# Patient Record
Sex: FEMALE | Race: WHITE | NOT HISPANIC OR LATINO | Employment: PART TIME | URBAN - METROPOLITAN AREA
[De-identification: names, ages, dates, MRNs, and addresses within clinical notes are randomized per-mention and may not be internally consistent; named-entity substitution may affect disease eponyms.]

---

## 2023-03-09 PROBLEM — L68.0 HIRSUTISM: Status: ACTIVE | Noted: 2023-03-09

## 2023-03-09 PROBLEM — E04.1 THYROID NODULE: Status: ACTIVE | Noted: 2023-03-09

## 2023-03-09 PROBLEM — E03.9 HYPOTHYROIDISM: Status: ACTIVE | Noted: 2023-03-09

## 2023-03-09 PROBLEM — E28.2 PCOS (POLYCYSTIC OVARIAN SYNDROME): Status: ACTIVE | Noted: 2023-03-09

## 2023-03-09 LAB
THYROTROPIN (MIU/L) IN SER/PLAS BY DETECTION LIMIT <= 0.05 MIU/L: 4.88 MIU/L (ref 0.44–3.98)
THYROXINE (T4) FREE (NG/DL) IN SER/PLAS: 0.83 NG/DL (ref 0.61–1.12)

## 2023-03-10 PROBLEM — H61.20 CERUMEN IMPACTION: Status: ACTIVE | Noted: 2023-03-10

## 2023-03-10 PROBLEM — L70.9 ACNE: Status: ACTIVE | Noted: 2023-03-10

## 2023-03-10 PROBLEM — E66.3 OVERWEIGHT WITH BODY MASS INDEX (BMI) OF 27 TO 27.9 IN ADULT: Status: ACTIVE | Noted: 2023-03-10

## 2023-03-10 PROBLEM — H61.22 IMPACTED CERUMEN OF LEFT EAR: Status: ACTIVE | Noted: 2023-03-10

## 2023-03-10 PROBLEM — N92.6 IRREGULAR MENSES: Status: ACTIVE | Noted: 2023-03-10

## 2023-03-10 PROBLEM — H60.90 OTITIS EXTERNA: Status: ACTIVE | Noted: 2023-03-10

## 2023-03-10 RX ORDER — OFLOXACIN 3 MG/ML
5 SOLUTION AURICULAR (OTIC) 2 TIMES DAILY
COMMUNITY

## 2023-03-14 ENCOUNTER — OFFICE VISIT (OUTPATIENT)
Dept: PRIMARY CARE | Facility: CLINIC | Age: 43
End: 2023-03-14
Payer: COMMERCIAL

## 2023-03-14 VITALS
BODY MASS INDEX: 27.48 KG/M2 | HEART RATE: 52 BPM | WEIGHT: 140 LBS | OXYGEN SATURATION: 99 % | SYSTOLIC BLOOD PRESSURE: 102 MMHG | DIASTOLIC BLOOD PRESSURE: 60 MMHG | HEIGHT: 60 IN | TEMPERATURE: 97.3 F

## 2023-03-14 DIAGNOSIS — Z00.00 ANNUAL PHYSICAL EXAM: Primary | ICD-10-CM

## 2023-03-14 PROCEDURE — 99396 PREV VISIT EST AGE 40-64: CPT | Performed by: INTERNAL MEDICINE

## 2023-03-14 ASSESSMENT — PATIENT HEALTH QUESTIONNAIRE - PHQ9
2. FEELING DOWN, DEPRESSED OR HOPELESS: NOT AT ALL
SUM OF ALL RESPONSES TO PHQ9 QUESTIONS 1 AND 2: 0
1. LITTLE INTEREST OR PLEASURE IN DOING THINGS: NOT AT ALL

## 2023-03-14 ASSESSMENT — PAIN SCALES - GENERAL: PAINLEVEL: 0-NO PAIN

## 2023-03-14 NOTE — PROGRESS NOTES
Subjective   Patient ID: Zarina Carrillo is a 42 y.o. female who presents for Annual Exam (Needing cpe for special [employer]ics).  She need physical exam for [employer]. She denied for any problem today.    Review of Systems   Constitutional:  Negative for activity change, appetite change, fatigue, fever and unexpected weight change.   HENT:  Negative for dental problem, ear discharge, hearing loss, nosebleeds, postnasal drip, sinus pain, sore throat, trouble swallowing and voice change.    Eyes:  Negative for photophobia, pain and visual disturbance.   Respiratory:  Negative for chest tightness, shortness of breath, wheezing and stridor.    Cardiovascular:  Negative for chest pain, palpitations and leg swelling.   Gastrointestinal:  Negative for abdominal pain, blood in stool, constipation, diarrhea, nausea and vomiting.   Endocrine: Negative for polydipsia, polyphagia and polyuria.   Genitourinary:  Negative for decreased urine volume, dyspareunia, dysuria, flank pain and urgency.   Musculoskeletal:  Negative for back pain, gait problem, myalgias and neck pain.   Skin:  Negative for rash and wound.   Allergic/Immunologic: Negative for environmental allergies and food allergies.   Neurological:  Negative for dizziness, seizures, syncope, facial asymmetry, speech difficulty, weakness, numbness and headaches.   Hematological:  Negative for adenopathy.   Psychiatric/Behavioral:  Negative for behavioral problems, confusion, hallucinations, sleep disturbance and suicidal ideas. The patient is not nervous/anxious.        Objective   /60   Pulse 52   Temp 36.3 °C (97.3 °F)   Ht 1.524 m (5')   Wt 63.5 kg (140 lb)   SpO2 99%   BMI 27.34 kg/m²     Physical Exam  Constitutional:       General: She is not in acute distress.     Appearance: Normal appearance. She is not ill-appearing or toxic-appearing.   HENT:      Head: Normocephalic and atraumatic.      Right Ear: There is impacted cerumen.      Left Ear: There  is impacted cerumen.      Nose: Nose normal.   Eyes:      Extraocular Movements: Extraocular movements intact.      Conjunctiva/sclera: Conjunctivae normal.      Pupils: Pupils are equal, round, and reactive to light.   Cardiovascular:      Rate and Rhythm: Normal rate and regular rhythm.      Pulses: Normal pulses.      Heart sounds: Normal heart sounds. No murmur heard.     No gallop.   Pulmonary:      Effort: No respiratory distress.      Breath sounds: No stridor. No wheezing or rales.   Abdominal:      General: There is no distension.      Tenderness: There is no abdominal tenderness. There is no right CVA tenderness, left CVA tenderness, guarding or rebound.   Musculoskeletal:         General: No swelling or deformity. Normal range of motion.      Cervical back: Normal range of motion and neck supple. No rigidity or tenderness.      Right lower leg: No edema.      Left lower leg: No edema.   Skin:     General: Skin is warm.      Coloration: Skin is not jaundiced.      Findings: No bruising, erythema or rash.   Neurological:      General: No focal deficit present.      Mental Status: She is alert and oriented to person, place, and time. Mental status is at baseline.      Cranial Nerves: No cranial nerve deficit.      Gait: Gait normal.   Psychiatric:         Mood and Affect: Mood normal.         Behavior: Behavior normal.         Thought Content: Thought content normal.       Assessment/Plan   Problem List Items Addressed This Visit    None  Visit Diagnoses       Annual physical exam    -  Primary   Patient seen in office for Annual Physical exam and except bilateral impacted cerumen no other significant problem found.    Paperwork for [employer] has been filled and signed. Patient and accompanying family member given answers of their questions according to current guidelines with the best of my medical knowledge.    RTC in 1 year or earlier as needed.

## 2023-03-15 ASSESSMENT — ENCOUNTER SYMPTOMS
WOUND: 0
HEADACHES: 0
POLYDIPSIA: 0
SLEEP DISTURBANCE: 0
SINUS PAIN: 0
VOICE CHANGE: 0
FEVER: 0
SHORTNESS OF BREATH: 0
VOMITING: 0
SORE THROAT: 0
PALPITATIONS: 0
NERVOUS/ANXIOUS: 0
HALLUCINATIONS: 0
ADENOPATHY: 0
FATIGUE: 0
POLYPHAGIA: 0
FACIAL ASYMMETRY: 0
NAUSEA: 0
FLANK PAIN: 0
BLOOD IN STOOL: 0
CONSTIPATION: 0
UNEXPECTED WEIGHT CHANGE: 0
SPEECH DIFFICULTY: 0
APPETITE CHANGE: 0
NECK PAIN: 0
WEAKNESS: 0
DIZZINESS: 0
EYE PAIN: 0
STRIDOR: 0
TROUBLE SWALLOWING: 0
ACTIVITY CHANGE: 0
SEIZURES: 0
MYALGIAS: 0
DYSURIA: 0
CHEST TIGHTNESS: 0
PHOTOPHOBIA: 0
WHEEZING: 0
BACK PAIN: 0
NUMBNESS: 0
CONFUSION: 0
ABDOMINAL PAIN: 0
DIARRHEA: 0

## 2023-03-21 PROBLEM — Z30.9 ENCOUNTER FOR BIRTH CONTROL: Status: ACTIVE | Noted: 2023-03-21

## 2023-03-21 PROBLEM — H66.001 ACUTE SUPPURATIVE OTITIS MEDIA OF RIGHT EAR WITHOUT SPONTANEOUS RUPTURE OF TYMPANIC MEMBRANE: Status: ACTIVE | Noted: 2023-03-21

## 2023-03-21 PROBLEM — H60.509 ACUTE OTITIS EXTERNA: Status: ACTIVE | Noted: 2023-03-10

## 2024-03-01 ENCOUNTER — TELEPHONE (OUTPATIENT)
Dept: PRIMARY CARE | Facility: CLINIC | Age: 44
End: 2024-03-01
Payer: COMMERCIAL

## 2024-03-01 DIAGNOSIS — E03.9 HYPOTHYROIDISM, UNSPECIFIED TYPE: Primary | ICD-10-CM

## 2024-04-26 ENCOUNTER — TELEPHONE (OUTPATIENT)
Dept: OBSTETRICS AND GYNECOLOGY | Facility: CLINIC | Age: 44
End: 2024-04-26
Payer: COMMERCIAL

## 2024-05-01 ENCOUNTER — HOSPITAL ENCOUNTER (EMERGENCY)
Facility: HOSPITAL | Age: 44
Discharge: HOME | End: 2024-05-01
Payer: COMMERCIAL

## 2024-05-01 ENCOUNTER — APPOINTMENT (OUTPATIENT)
Dept: RADIOLOGY | Facility: HOSPITAL | Age: 44
End: 2024-05-01
Payer: COMMERCIAL

## 2024-05-01 VITALS
DIASTOLIC BLOOD PRESSURE: 61 MMHG | BODY MASS INDEX: 25.57 KG/M2 | WEIGHT: 132 LBS | OXYGEN SATURATION: 100 % | RESPIRATION RATE: 18 BRPM | SYSTOLIC BLOOD PRESSURE: 141 MMHG | TEMPERATURE: 97.3 F | HEART RATE: 86 BPM

## 2024-05-01 DIAGNOSIS — S01.01XA LACERATION OF SCALP, INITIAL ENCOUNTER: ICD-10-CM

## 2024-05-01 DIAGNOSIS — S09.90XA HEAD INJURY, INITIAL ENCOUNTER: Primary | ICD-10-CM

## 2024-05-01 PROCEDURE — 70450 CT HEAD/BRAIN W/O DYE: CPT | Performed by: RADIOLOGY

## 2024-05-01 PROCEDURE — 72125 CT NECK SPINE W/O DYE: CPT

## 2024-05-01 PROCEDURE — 99285 EMERGENCY DEPT VISIT HI MDM: CPT | Mod: 25

## 2024-05-01 PROCEDURE — 12001 RPR S/N/AX/GEN/TRNK 2.5CM/<: CPT | Performed by: HEALTH CARE PROVIDER

## 2024-05-01 PROCEDURE — 72125 CT NECK SPINE W/O DYE: CPT | Performed by: RADIOLOGY

## 2024-05-01 PROCEDURE — 2500000001 HC RX 250 WO HCPCS SELF ADMINISTERED DRUGS (ALT 637 FOR MEDICARE OP): Performed by: HEALTH CARE PROVIDER

## 2024-05-01 PROCEDURE — 70450 CT HEAD/BRAIN W/O DYE: CPT

## 2024-05-01 PROCEDURE — 99283 EMERGENCY DEPT VISIT LOW MDM: CPT

## 2024-05-01 RX ADMIN — Medication 1 APPLICATION: at 17:26

## 2024-05-01 ASSESSMENT — LIFESTYLE VARIABLES
HAVE YOU EVER FELT YOU SHOULD CUT DOWN ON YOUR DRINKING: NO
TOTAL SCORE: 0
EVER FELT BAD OR GUILTY ABOUT YOUR DRINKING: NO
EVER HAD A DRINK FIRST THING IN THE MORNING TO STEADY YOUR NERVES TO GET RID OF A HANGOVER: NO
HAVE PEOPLE ANNOYED YOU BY CRITICIZING YOUR DRINKING: NO

## 2024-05-01 ASSESSMENT — PAIN DESCRIPTION - PROGRESSION: CLINICAL_PROGRESSION: NOT CHANGED

## 2024-05-01 ASSESSMENT — COLUMBIA-SUICIDE SEVERITY RATING SCALE - C-SSRS
2. HAVE YOU ACTUALLY HAD ANY THOUGHTS OF KILLING YOURSELF?: NO
1. IN THE PAST MONTH, HAVE YOU WISHED YOU WERE DEAD OR WISHED YOU COULD GO TO SLEEP AND NOT WAKE UP?: NO
6. HAVE YOU EVER DONE ANYTHING, STARTED TO DO ANYTHING, OR PREPARED TO DO ANYTHING TO END YOUR LIFE?: NO

## 2024-05-01 ASSESSMENT — PAIN SCALES - GENERAL
PAINLEVEL_OUTOF10: 0 - NO PAIN
PAINLEVEL_OUTOF10: 0 - NO PAIN

## 2024-05-01 ASSESSMENT — PAIN - FUNCTIONAL ASSESSMENT: PAIN_FUNCTIONAL_ASSESSMENT: 0-10

## 2024-05-01 NOTE — ED PROVIDER NOTES
HPI   Chief Complaint   Patient presents with    Head Injury       CC: Minor head injury  HPI:   43-year-old female presents ED via EMS she was playing volleyball when another player ran into her knocking her backwards patient hit her head on the floor, there was no reported loss of consciousness, she does have a small hematoma to the right parietal scalp, patient does not take any long-term anticoagulant antiplatelet medications she denies any headache, dizziness or cervical neck tenderness patient denies any chest pain, abdominal pain, back pain, denies any upper/lower extremity weakness numbness pain or paresthesia.    Additional Limitations to History:   External Records Reviewed: I reviewed recent and relevant outside records including   History Obtained From:     Past Medical History: Per HPI  Medications: Reviewed in EMR and with patient  Allergies:  Reviewed in EMR  Past Surgical History:   Social History:     ------------------------------------------------------------------------------------------------------  Physical Exam:  --Vital signs reviewed in nursing triage note, EMR flow sheets, and at patient's bedside  GEN:  A&Ox3, no acute distress, appears comfortable.  Conversational and appropriate.  No confusion or gross mental status changes.  EYES: EOMI, non-injected sclera.  ENT: Moist mucous membranes, no apparent injuries or lesions.   CARDIO: Normal rate and regular rhythm. No murmurs, rubs, or gallops.  2+ equal pulses of the distal extremities.   PULM: Clear to auscultation bilaterally. No rales, rhonchi, or wheezes. Good symmetric chest expansion.  GI: Soft, non-tender, non-distended. No rebound tenderness or guarding.  SKIN: Warm and dry, no rashes or lesions.  MSK: ROM intact the extremities without contractures.   EXT: No peripheral edema, contusions, or wounds.   NEURO: Cranial nerves II-XII grossly intact. Sensation to light touch intact and equal bilaterally in upper and lower extremities.   Symmetric 5/5 strength in upper and lower extremities.  PSYCH: Appropriate mood and behavior, converses and responds appropriately during exam.  -------------------------------------------------------------------------------------------------------        Differential Diagnoses Considered:   Chronic Medical Conditions Significantly Affecting Care:   Diagnostic testing considered: [PERC, D-Dimer, PECARN, etc.]      - I independently interpreted: [CXR, CT, POCUS, etc. including your interpretation]  - Labs notable for     Escalation of Care: Appropriate for   Social Determinants of Health Significantly Affecting Care: [Homelessness, lacking transportation, uninsured, unable to afford medications]  Prescription Drug Consideration: [Antibiotics, antivirals, pain medications, etc.]  Discussion of Management with Other Providers:  I discussed the patient/results with: [admitting team, consultant, radiologist, social work, EPAT, case management, PT/OT, RT, PCP, etc.]      Jamie Garvin PA-C                          Otto Coma Scale Score: 15                     Patient History   Past Medical History:   Diagnosis Date    Other specified abnormal findings of blood chemistry 01/14/2017    Abnormal TSH    Personal history of diseases of the skin and subcutaneous tissue 01/14/2017    History of rosacea    Personal history of other diseases of the digestive system     History of celiac disease    Varicella without complication     Varicella without complication    Xerosis cutis     Dry skin     Past Surgical History:   Procedure Laterality Date    CHOLECYSTECTOMY  05/02/2013    Cholecystectomy    KNEE SURGERY  09/11/2015    Knee Surgery     Family History   Problem Relation Name Age of Onset    Breast cancer Mother      Melanoma Mother          malignant    Breast cancer Maternal Grandmother      Breast cancer Other grandparent      Social History     Tobacco Use    Smoking status: Not on file    Smokeless tobacco: Not on file    Substance Use Topics    Alcohol use: Not on file    Drug use: Not on file       Physical Exam   ED Triage Vitals [05/01/24 1645]   Temperature Heart Rate Respirations BP   36.6 °C (97.9 °F) 51 18 139/71      Pulse Ox Temp Source Heart Rate Source Patient Position   97 % Temporal Monitor Sitting      BP Location FiO2 (%)     Right arm --       Physical Exam  HENT:      Head:        Comments: 1 cm long superficial scalp laceration, no foreign bodies, bleeding controlled with direct pressure.        ED Course & MDM   Diagnoses as of 05/02/24 0812   Head injury, initial encounter   Laceration of scalp, initial encounter       Medical Decision Making  43-year-old female status post minor head injury no loss of consciousness imaging unremarkable, small scalp laceration repaired with 1 staple advised family follow-up in 5 to 7 days for staple removal with her primary care provider return to ED sooner for any abnormal behavior, confusion, vomiting or fevers.        Procedure  Laceration Repair    Performed by: Jamie Garvin PA-C  Authorized by: Jamie Garvin PA-C    Consent:     Consent obtained:  Verbal    Consent given by:  Parent and patient    Risks, benefits, and alternatives were discussed: yes      Risks discussed:  Infection    Alternatives discussed:  No treatment  Universal protocol:     Patient identity confirmed:  Verbally with patient  Laceration details:     Location:  Scalp    Scalp location:  R parietal    Length (cm):  1    Depth (mm):  4  Exploration:     Hemostasis achieved with:  LET  Treatment:     Area cleansed with:  Chlorhexidine    Amount of cleaning:  Standard    Debridement:  None    Undermining:  None  Skin repair:     Repair method:  Staples    Number of staples:  1  Approximation:     Approximation:  Close  Repair type:     Repair type:  Simple  Post-procedure details:     Dressing:  Open (no dressing)       Jamie Garvin PA-C  05/01/24 1655       Jamie Garvin PA-C  05/01/24  1752       Jamie Garvin PA-C  05/02/24 0813

## 2024-05-01 NOTE — ED TRIAGE NOTES
STEPHEN Roy C/O hed injury while playing volly ball in park.  Fell form standing position to sand court.  Reports LOC 30 seconds

## 2024-05-07 ENCOUNTER — OFFICE VISIT (OUTPATIENT)
Dept: PRIMARY CARE | Facility: CLINIC | Age: 44
End: 2024-05-07
Payer: COMMERCIAL

## 2024-05-07 VITALS
WEIGHT: 135 LBS | HEART RATE: 60 BPM | BODY MASS INDEX: 26.5 KG/M2 | DIASTOLIC BLOOD PRESSURE: 74 MMHG | OXYGEN SATURATION: 98 % | TEMPERATURE: 98 F | HEIGHT: 60 IN | SYSTOLIC BLOOD PRESSURE: 124 MMHG

## 2024-05-07 DIAGNOSIS — Z48.02 VISIT FOR SUTURE REMOVAL: Primary | ICD-10-CM

## 2024-05-07 PROCEDURE — 99212 OFFICE O/P EST SF 10 MIN: CPT | Performed by: INTERNAL MEDICINE

## 2024-05-07 PROCEDURE — 1036F TOBACCO NON-USER: CPT | Performed by: INTERNAL MEDICINE

## 2024-05-07 ASSESSMENT — ENCOUNTER SYMPTOMS
SHORTNESS OF BREATH: 0
HEADACHES: 0
VOMITING: 0
FEVER: 0
BLOOD IN STOOL: 0
DIZZINESS: 0
COUGH: 0
FATIGUE: 0
MYALGIAS: 0
EYE PAIN: 0
CONSTIPATION: 0
HEMATURIA: 0
SEIZURES: 0
CONFUSION: 0
FLANK PAIN: 0
POLYDIPSIA: 0
VOICE CHANGE: 0
SORE THROAT: 0
APPETITE CHANGE: 0
WHEEZING: 0
TROUBLE SWALLOWING: 0
DIARRHEA: 0
FACIAL ASYMMETRY: 0
SLEEP DISTURBANCE: 0
HALLUCINATIONS: 0
TREMORS: 0
PALPITATIONS: 0
NECK PAIN: 0
ABDOMINAL PAIN: 0
SINUS PAIN: 0
WEAKNESS: 0
ACTIVITY CHANGE: 0
COLOR CHANGE: 0
NUMBNESS: 0
NAUSEA: 0
ARTHRALGIAS: 0
POLYPHAGIA: 0
ADENOPATHY: 0
DYSURIA: 0
WOUND: 1
SPEECH DIFFICULTY: 0
NERVOUS/ANXIOUS: 0

## 2024-05-07 NOTE — LETTER
May 7, 2024     Patient: Zarina Carrillo   YOB: 1980   Date of Visit: 5/7/2024       To Whom It May Concern:    It is my medical opinion that Zarina Carrillo may return to full duty immediately with no restrictions.    If you have any questions or concerns, please don't hesitate to call.         Sincerely,        Gabo Tena MD    CC: No Recipients

## 2024-05-07 NOTE — PROGRESS NOTES
"Subjective   Patient ID: Zarina Carrillo is a 43 y.o. female who presents for Head Injury (Laceration on head with 1 staple on right side of the head ).    Head Injury   Pertinent negatives include no headaches, numbness, vomiting or weakness.      Patient has 1 staple on right side of head. She denied for discharge or pain at the site of staple.    Review of Systems   Constitutional:  Negative for activity change, appetite change, fatigue and fever.   HENT:  Negative for dental problem, ear discharge, hearing loss, nosebleeds, postnasal drip, sinus pain, sore throat, trouble swallowing and voice change.    Eyes:  Negative for pain.   Respiratory:  Negative for cough, shortness of breath and wheezing.    Cardiovascular:  Negative for chest pain, palpitations and leg swelling.   Gastrointestinal:  Negative for abdominal pain, blood in stool, constipation, diarrhea, nausea and vomiting.   Endocrine: Negative for polydipsia, polyphagia and polyuria.   Genitourinary:  Negative for decreased urine volume, dyspareunia, dysuria, flank pain, hematuria and urgency.   Musculoskeletal:  Negative for arthralgias, gait problem, myalgias and neck pain.   Skin:  Positive for wound. Negative for color change and rash.   Allergic/Immunologic: Negative for environmental allergies and food allergies.   Neurological:  Negative for dizziness, tremors, seizures, syncope, facial asymmetry, speech difficulty, weakness, numbness and headaches.   Hematological:  Negative for adenopathy.   Psychiatric/Behavioral:  Negative for behavioral problems, confusion, hallucinations and sleep disturbance. The patient is not nervous/anxious.      Objective   /74   Pulse 60   Temp 36.7 °C (98 °F)   Ht 1.53 m (5' 0.25\")   Wt 61.2 kg (135 lb)   SpO2 98%   BMI 26.15 kg/m²     Physical Exam  Constitutional:       General: She is not in acute distress.     Appearance: She is not ill-appearing or toxic-appearing.   Pulmonary:      Effort: Pulmonary " effort is normal.   Musculoskeletal:         General: Normal range of motion.      Cervical back: Normal range of motion.   Skin:     General: Skin is warm.   Neurological:      General: No focal deficit present.      Mental Status: She is alert and oriented to person, place, and time.   Psychiatric:         Mood and Affect: Mood normal.         Behavior: Behavior normal.       Assessment/Plan   Problem List Items Addressed This Visit          Musculoskeletal and Injuries    Visit for suture removal - Primary     1 Staple has been removed.  Patient has been provided with the letter to return to work.

## 2024-05-20 ENCOUNTER — APPOINTMENT (OUTPATIENT)
Dept: ENDOCRINOLOGY | Facility: CLINIC | Age: 44
End: 2024-05-20
Payer: COMMERCIAL

## 2024-06-07 ENCOUNTER — OFFICE VISIT (OUTPATIENT)
Dept: PRIMARY CARE | Facility: CLINIC | Age: 44
End: 2024-06-07
Payer: COMMERCIAL

## 2024-06-07 ENCOUNTER — LAB (OUTPATIENT)
Dept: LAB | Facility: LAB | Age: 44
End: 2024-06-07
Payer: COMMERCIAL

## 2024-06-07 VITALS
DIASTOLIC BLOOD PRESSURE: 82 MMHG | TEMPERATURE: 97.8 F | OXYGEN SATURATION: 98 % | HEART RATE: 50 BPM | BODY MASS INDEX: 26.9 KG/M2 | HEIGHT: 60 IN | SYSTOLIC BLOOD PRESSURE: 118 MMHG | WEIGHT: 137 LBS

## 2024-06-07 DIAGNOSIS — R73.09 ABNORMAL GLUCOSE: Primary | ICD-10-CM

## 2024-06-07 DIAGNOSIS — R73.09 ABNORMAL GLUCOSE: ICD-10-CM

## 2024-06-07 DIAGNOSIS — E03.9 HYPOTHYROIDISM, UNSPECIFIED TYPE: ICD-10-CM

## 2024-06-07 LAB — TSH SERPL-ACNC: 2.87 MIU/L (ref 0.44–3.98)

## 2024-06-07 PROCEDURE — 83036 HEMOGLOBIN GLYCOSYLATED A1C: CPT

## 2024-06-07 PROCEDURE — 84443 ASSAY THYROID STIM HORMONE: CPT

## 2024-06-07 PROCEDURE — 99212 OFFICE O/P EST SF 10 MIN: CPT | Performed by: INTERNAL MEDICINE

## 2024-06-07 PROCEDURE — 36415 COLL VENOUS BLD VENIPUNCTURE: CPT

## 2024-06-07 ASSESSMENT — ENCOUNTER SYMPTOMS
UNEXPECTED WEIGHT CHANGE: 0
NERVOUS/ANXIOUS: 0
DIZZINESS: 0
HEADACHES: 0
TREMORS: 0
POLYPHAGIA: 0
CONFUSION: 0
WOUND: 0
POLYDIPSIA: 0
WEAKNESS: 0
DYSURIA: 0
BLOOD IN STOOL: 0
SORE THROAT: 0
HEMATURIA: 0
NAUSEA: 0
CHEST TIGHTNESS: 0
ACTIVITY CHANGE: 0
STRIDOR: 0
FATIGUE: 0
BACK PAIN: 0
CONSTIPATION: 0
VOMITING: 0
HALLUCINATIONS: 0
NUMBNESS: 0
SINUS PAIN: 0
SEIZURES: 0
EYE PAIN: 0
SHORTNESS OF BREATH: 0
WHEEZING: 0
FEVER: 0
ARTHRALGIAS: 0
PALPITATIONS: 0
SLEEP DISTURBANCE: 0
PHOTOPHOBIA: 0
MYALGIAS: 0
COLOR CHANGE: 0
FLANK PAIN: 0
COUGH: 0
DIARRHEA: 0
NECK PAIN: 0
JOINT SWELLING: 0
ABDOMINAL PAIN: 0
ADENOPATHY: 0
APPETITE CHANGE: 0
SPEECH DIFFICULTY: 0

## 2024-06-07 NOTE — PROGRESS NOTES
Subjective   Patient ID: Zarina Carrillo is a 43 y.o. female who presents for Establish Care (Wanting to check sugar levels per eye doctor concerned with diabetes).    HPI   Patient wanted to check A1c level on the advise of eye doctor because as per family member, eye doctor saw worsening of cataract.     Review of Systems   Constitutional:  Negative for activity change, appetite change, fatigue, fever and unexpected weight change.   HENT:  Negative for dental problem, ear discharge, hearing loss, nosebleeds, postnasal drip, sinus pain and sore throat.    Eyes:  Negative for photophobia, pain and visual disturbance.   Respiratory:  Negative for cough, chest tightness, shortness of breath, wheezing and stridor.    Cardiovascular:  Negative for chest pain, palpitations and leg swelling.   Gastrointestinal:  Negative for abdominal pain, blood in stool, constipation, diarrhea, nausea and vomiting.   Endocrine: Negative for polydipsia, polyphagia and polyuria.   Genitourinary:  Negative for decreased urine volume, dyspareunia, dysuria, flank pain, hematuria and urgency.   Musculoskeletal:  Negative for arthralgias, back pain, gait problem, joint swelling, myalgias and neck pain.   Skin:  Negative for color change, rash and wound.   Neurological:  Negative for dizziness, tremors, seizures, syncope, speech difficulty, weakness, numbness and headaches.   Hematological:  Negative for adenopathy.   Psychiatric/Behavioral:  Negative for confusion, hallucinations, sleep disturbance and suicidal ideas. The patient is not nervous/anxious.      Objective   /82   Pulse 50   Temp 36.6 °C (97.8 °F)   Ht 1.524 m (5')   Wt 62.1 kg (137 lb)   SpO2 98%   BMI 26.76 kg/m²     Physical Exam  Vitals and nursing note reviewed.   HENT:      Nose: Nose normal. No congestion.   Musculoskeletal:      Cervical back: Normal range of motion.   Neurological:      General: No focal deficit present.      Mental Status: She is alert and  oriented to person, place, and time.       Assessment/Plan   Problem List Items Addressed This Visit    None  Visit Diagnoses       Abnormal glucose    -  Primary    Relevant Orders    Hemoglobin A1C

## 2024-06-08 LAB
EST. AVERAGE GLUCOSE BLD GHB EST-MCNC: 103 MG/DL
HBA1C MFR BLD: 5.2 %

## 2024-06-27 ENCOUNTER — APPOINTMENT (OUTPATIENT)
Dept: ENDOCRINOLOGY | Facility: CLINIC | Age: 44
End: 2024-06-27
Payer: COMMERCIAL

## 2024-06-27 VITALS
BODY MASS INDEX: 27.41 KG/M2 | WEIGHT: 139.6 LBS | DIASTOLIC BLOOD PRESSURE: 66 MMHG | RESPIRATION RATE: 16 BRPM | HEIGHT: 60 IN | HEART RATE: 48 BPM | SYSTOLIC BLOOD PRESSURE: 112 MMHG

## 2024-06-27 DIAGNOSIS — E06.3 HASHIMOTO'S THYROIDITIS: Primary | ICD-10-CM

## 2024-06-27 PROCEDURE — 1036F TOBACCO NON-USER: CPT | Performed by: INTERNAL MEDICINE

## 2024-06-27 PROCEDURE — 99213 OFFICE O/P EST LOW 20 MIN: CPT | Performed by: INTERNAL MEDICINE

## 2024-06-27 ASSESSMENT — ENCOUNTER SYMPTOMS
FATIGUE: 0
FEVER: 0
SHORTNESS OF BREATH: 0
PALPITATIONS: 0
VOMITING: 0
HEADACHES: 0
DIARRHEA: 0
NAUSEA: 0
COUGH: 0
CHILLS: 0

## 2024-06-27 NOTE — PROGRESS NOTES
Endocrinology: Follow up visit  Subjective   Patient ID: Zarina Carrillo is a 43 y.o. female who presents for Hypothyroidism and Goiter.    PCP: Gabo Tena MD    HPI  Last year levels were off slightly.  Opted not to start t4.   Feeling fine.    Mom is stressed as moving zarina to group home soon.    Review of Systems   Constitutional:  Negative for chills, fatigue and fever.   Respiratory:  Negative for cough and shortness of breath.    Cardiovascular:  Negative for chest pain and palpitations.   Gastrointestinal:  Negative for diarrhea, nausea and vomiting.   Neurological:  Negative for headaches.       Patient Active Problem List   Diagnosis    Hirsutism    PCOS (polycystic ovarian syndrome)    Hypothyroidism    Thyroid nodule    Acne    Cerumen impaction    Impacted cerumen of left ear    Irregular menses    Acute otitis externa    Encounter for birth control    Acute suppurative otitis media of right ear without spontaneous rupture of tympanic membrane    Visit for suture removal        Home Meds:  Current Outpatient Medications   Medication Instructions    carbamide peroxide (Debrox) 6.5 % otic solution Use as directed    ofloxacin (Floxin) 0.3 % otic solution 5 drops, Left Ear, 2 times daily        Allergies   Allergen Reactions    Other Unknown        Objective   Vitals:    06/27/24 1524   BP: 112/66   Pulse: (!) 48   Resp: 16      Vitals:    06/27/24 1524   Weight: 63.3 kg (139 lb 9.6 oz)      Body mass index is 27.26 kg/m².   Physical Exam  Constitutional:       Appearance: Normal appearance. She is overweight.   HENT:      Head: Normocephalic and atraumatic.   Neck:      Thyroid: No thyroid mass, thyromegaly or thyroid tenderness.   Cardiovascular:      Rate and Rhythm: Normal rate and regular rhythm.      Heart sounds: No murmur heard.     No gallop.   Pulmonary:      Effort: Pulmonary effort is normal.      Breath sounds: Normal breath sounds.   Abdominal:      Palpations: Abdomen is soft.      Comments:  benign   Neurological:      General: No focal deficit present.      Mental Status: She is alert and oriented to person, place, and time.      Deep Tendon Reflexes: Reflexes are normal and symmetric.   Psychiatric:         Behavior: Behavior is cooperative.         Labs:  Lab Results   Component Value Date    HGBA1C 5.2 06/07/2024    TSH 2.87 06/07/2024    FREET4 0.83 03/09/2023      Lab Results   Component Value Date    THYROIDPAB 419 (A) 02/04/2021        Assessment/Plan   Problem List Items Addressed This Visit    None  Visit Diagnoses       Hashimoto's thyroiditis    -  Primary          Labs this year normal.  No need for t4 at this time  Follow up in one year    Electronically signed by:  Susie Zhao MD 06/27/24 3:48 PM

## 2024-07-29 ENCOUNTER — HOSPITAL ENCOUNTER (OUTPATIENT)
Dept: RADIOLOGY | Facility: HOSPITAL | Age: 44
Discharge: HOME | End: 2024-07-29
Payer: COMMERCIAL

## 2024-07-29 VITALS — WEIGHT: 132 LBS | HEIGHT: 60 IN | BODY MASS INDEX: 25.91 KG/M2

## 2024-07-29 DIAGNOSIS — Z12.31 BREAST CANCER SCREENING BY MAMMOGRAM: ICD-10-CM

## 2024-07-29 PROCEDURE — 77063 BREAST TOMOSYNTHESIS BI: CPT | Performed by: RADIOLOGY

## 2024-07-29 PROCEDURE — 77067 SCR MAMMO BI INCL CAD: CPT

## 2024-07-29 PROCEDURE — 77067 SCR MAMMO BI INCL CAD: CPT | Performed by: RADIOLOGY

## 2024-09-23 ENCOUNTER — TELEPHONE (OUTPATIENT)
Dept: PRIMARY CARE | Facility: CLINIC | Age: 44
End: 2024-09-23

## 2024-09-23 ENCOUNTER — APPOINTMENT (OUTPATIENT)
Dept: PRIMARY CARE | Facility: CLINIC | Age: 44
End: 2024-09-23
Payer: COMMERCIAL

## 2024-09-23 VITALS
HEIGHT: 60 IN | OXYGEN SATURATION: 96 % | TEMPERATURE: 99.3 F | BODY MASS INDEX: 27.09 KG/M2 | WEIGHT: 138 LBS | SYSTOLIC BLOOD PRESSURE: 114 MMHG | DIASTOLIC BLOOD PRESSURE: 70 MMHG | RESPIRATION RATE: 16 BRPM | HEART RATE: 60 BPM

## 2024-09-23 DIAGNOSIS — Q90.9 DOWN'S SYNDROME (HHS-HCC): ICD-10-CM

## 2024-09-23 DIAGNOSIS — E03.9 HYPOTHYROIDISM, UNSPECIFIED TYPE: Primary | ICD-10-CM

## 2024-09-23 DIAGNOSIS — Z00.00 ANNUAL PHYSICAL EXAM: ICD-10-CM

## 2024-09-23 DIAGNOSIS — M79.10 MYALGIA: ICD-10-CM

## 2024-09-23 DIAGNOSIS — R05.9 COUGH, UNSPECIFIED TYPE: ICD-10-CM

## 2024-09-23 DIAGNOSIS — H61.23 BILATERAL IMPACTED CERUMEN: ICD-10-CM

## 2024-09-23 PROCEDURE — 3008F BODY MASS INDEX DOCD: CPT | Performed by: FAMILY MEDICINE

## 2024-09-23 PROCEDURE — 1036F TOBACCO NON-USER: CPT | Performed by: FAMILY MEDICINE

## 2024-09-23 PROCEDURE — 99396 PREV VISIT EST AGE 40-64: CPT | Performed by: FAMILY MEDICINE

## 2024-09-23 PROCEDURE — 99214 OFFICE O/P EST MOD 30 MIN: CPT | Performed by: FAMILY MEDICINE

## 2024-09-23 RX ORDER — IBUPROFEN 200 MG
200 TABLET ORAL EVERY 6 HOURS
Qty: 30 TABLET | Refills: 11 | Status: SHIPPED | OUTPATIENT
Start: 2024-09-23 | End: 2025-09-23

## 2024-09-23 RX ORDER — GUAIFENESIN/DEXTROMETHORPHAN 100-10MG/5
5 SYRUP ORAL 3 TIMES DAILY PRN
Qty: 118 ML | Refills: 11 | Status: SHIPPED | OUTPATIENT
Start: 2024-09-23 | End: 2025-09-23

## 2024-09-23 RX ORDER — PHENYLEPHRINE HCL 10 MG/1
10 TABLET, FILM COATED ORAL EVERY 4 HOURS PRN
Qty: 30 TABLET | Refills: 11 | Status: SHIPPED | OUTPATIENT
Start: 2024-09-23 | End: 2025-09-23

## 2024-09-23 ASSESSMENT — ANXIETY QUESTIONNAIRES
3. WORRYING TOO MUCH ABOUT DIFFERENT THINGS: NOT AT ALL
5. BEING SO RESTLESS THAT IT IS HARD TO SIT STILL: NOT AT ALL
IF YOU CHECKED OFF ANY PROBLEMS ON THIS QUESTIONNAIRE, HOW DIFFICULT HAVE THESE PROBLEMS MADE IT FOR YOU TO DO YOUR WORK, TAKE CARE OF THINGS AT HOME, OR GET ALONG WITH OTHER PEOPLE: NOT DIFFICULT AT ALL
GAD7 TOTAL SCORE: 0
7. FEELING AFRAID AS IF SOMETHING AWFUL MIGHT HAPPEN: NOT AT ALL
4. TROUBLE RELAXING: NOT AT ALL
6. BECOMING EASILY ANNOYED OR IRRITABLE: NOT AT ALL
1. FEELING NERVOUS, ANXIOUS, OR ON EDGE: NOT AT ALL
2. NOT BEING ABLE TO STOP OR CONTROL WORRYING: NOT AT ALL

## 2024-09-23 ASSESSMENT — PATIENT HEALTH QUESTIONNAIRE - PHQ9
2. FEELING DOWN, DEPRESSED OR HOPELESS: NOT AT ALL
1. LITTLE INTEREST OR PLEASURE IN DOING THINGS: NOT AT ALL
SUM OF ALL RESPONSES TO PHQ9 QUESTIONS 1 AND 2: 0

## 2024-09-23 NOTE — PROGRESS NOTES
Subjective   Zarina Carrillo is a 43 y.o. female who presents for New Patient Visit.  HPI  PMH:  Downs syndrome  Celiac disease     Here to get est w mom   Lives in group home  Has BF  No Fhx of colon ca   Does mammos  Gained a few lb.   Needs prn for cough syrup, decongest, ibu.   Ex 6 d/wk   No longer swimming.   Works at [employer].   Sees Dr gaston gyn no pap.     Current Outpatient Medications on File Prior to Visit   Medication Sig Dispense Refill    carbamide peroxide (Debrox) 6.5 % otic solution Use as directed      ofloxacin (Floxin) 0.3 % otic solution Administer 5 drops into the left ear 2 times a day.       No current facility-administered medications on file prior to visit.                  Objective   /70 (BP Location: Left arm)   Pulse 60   Temp 37.4 °C (99.3 °F)   Resp 16   Ht 1.524 m (5')   Wt 62.6 kg (138 lb)   SpO2 96%   BMI 26.95 kg/m²    Physical Exam  General: NAD  HEENT: PERRLA, nml OP, b/l cerumen impaction, downs facies. Strabismus   Neck: no cervical VICKY  Heart: RRR no murmur, no edema   Lungs: CTA b/l, no wheeze or rhonchi   GI: abd soft, nontender, nondistended.   MSK: no c/c/e. nml gait   Skin: warm and dry  Psych: cooperative, appropriate affect  Neuro: speech clear. A&Ox3  Assessment/Plan   Problem List Items Addressed This Visit       Hypothyroidism - Primary  TSH nml in June   Not on meds currently   Has seen Dr Zhao in past       Cerumen impaction  Attempted irrigation in office and unsuccessful   RF ENT     Relevant Orders    Referral to ENT     Other Visit Diagnoses       Annual physical exam      CPE:overall doing well. Discussed diet/ex changes  BMI: 26  VACC: reviewed, tdap UTD, consider flu/covid  COLON CA SCRN: 45  LABS: ordered, TSH, A1c nml in June   PAP: n/a d/t special needs  MAMMO: UTD  DEXA: 65  RTC yearly or prn     Relevant Orders    CBC and Auto Differential    Comprehensive Metabolic Panel    Lipid Panel    Down's syndrome ([facility])       No complications       Myalgia    Sent in prn OTC ibu Rx for group home      Relevant Medications    ibuprofen 200 mg tablet    Cough, unspecified type      Sent in prn OTC cough Rx for group home     Relevant Medications    dextromethorphan-guaifenesin (Robitussin DM)  mg/5 mL oral liquid    phenylephrine (Nasal Decongestant, PE,) 10 mg tablet    Celiac: gluten free

## 2024-09-23 NOTE — TELEPHONE ENCOUNTER
Patient's mother called in stated that she is obsessed with cleaning her ears, stated that the facility is always requesting to have them cleaned, nothing is in them, would like for you to let her know without her knowing about this message, Advise?

## 2024-09-25 ENCOUNTER — TELEPHONE (OUTPATIENT)
Dept: PRIMARY CARE | Facility: CLINIC | Age: 44
End: 2024-09-25
Payer: COMMERCIAL

## 2024-10-01 ENCOUNTER — OFFICE VISIT (OUTPATIENT)
Dept: OTOLARYNGOLOGY | Facility: CLINIC | Age: 44
End: 2024-10-01
Payer: COMMERCIAL

## 2024-10-01 VITALS — BODY MASS INDEX: 27.68 KG/M2 | HEIGHT: 60 IN | TEMPERATURE: 97.3 F | WEIGHT: 141 LBS

## 2024-10-01 DIAGNOSIS — H90.0 CONDUCTIVE HEARING LOSS, BILATERAL: Primary | ICD-10-CM

## 2024-10-01 DIAGNOSIS — H61.23 BILATERAL IMPACTED CERUMEN: ICD-10-CM

## 2024-10-01 PROCEDURE — 1036F TOBACCO NON-USER: CPT | Performed by: OTOLARYNGOLOGY

## 2024-10-01 PROCEDURE — 69210 REMOVE IMPACTED EAR WAX UNI: CPT | Performed by: OTOLARYNGOLOGY

## 2024-10-01 PROCEDURE — 99203 OFFICE O/P NEW LOW 30 MIN: CPT | Performed by: OTOLARYNGOLOGY

## 2024-10-01 PROCEDURE — 3008F BODY MASS INDEX DOCD: CPT | Performed by: OTOLARYNGOLOGY

## 2024-10-01 NOTE — PROGRESS NOTES
Chief Complaint   Patient presents with    New Patient Visit     PCP  TRIED TO FLUSH EARS OUT COULD NOT GET ANYTHING TO IMPACTED SHE IS USING Q-TIPS     HPI:  Zarina Carrillo is a 43 y.o. female who presents with her parents today with complaints of some bilateral ear pressure and hearing loss.  Was diagnosed with wax impactions at her PCPs office but was unable to be removed by flushing.    PMH:  Past Medical History:   Diagnosis Date    Other specified abnormal findings of blood chemistry 01/14/2017    Abnormal TSH    Personal history of diseases of the skin and subcutaneous tissue 01/14/2017    History of rosacea    Personal history of other diseases of the digestive system     History of celiac disease    Varicella without complication     Varicella without complication    Xerosis cutis     Dry skin     Past Surgical History:   Procedure Laterality Date    CHOLECYSTECTOMY  05/02/2013    Cholecystectomy    KNEE SURGERY  09/11/2015    Knee Surgery         Medications:     Current Outpatient Medications:     carbamide peroxide (Debrox) 6.5 % otic solution, Use as directed, Disp: , Rfl:     dextromethorphan-guaifenesin (Robitussin DM)  mg/5 mL oral liquid, Take 5 mL by mouth 3 times a day as needed for cough. (Patient not taking: Reported on 10/1/2024), Disp: 118 mL, Rfl: 11    ibuprofen 200 mg tablet, Take 1 tablet (200 mg) by mouth every 6 hours. (Patient not taking: Reported on 10/1/2024), Disp: 30 tablet, Rfl: 11    ofloxacin (Floxin) 0.3 % otic solution, Administer 5 drops into the left ear 2 times a day., Disp: , Rfl:     phenylephrine (Nasal Decongestant, PE,) 10 mg tablet, Take 1 tablet (10 mg) by mouth every 4 hours if needed for congestion. (Patient not taking: Reported on 10/1/2024), Disp: 30 tablet, Rfl: 11     Allergies:  Allergies   Allergen Reactions    Gluten Agitation        ROS:  Review of systems normal unless stated otherwise in the HPI and/or PMH.    Physical Exam:  Temperature 36.3 °C  (97.3 °F), height 1.524 m (5'), weight 64 kg (141 lb). Body mass index is 27.54 kg/m².     GENERAL APPEARANCE: Well developed and well nourished.  Alert and oriented in no acute distress.  Normal vocal quality.      HEAD/FACE: No erythema or edema or facial tenderness.  Normal facial nerve function bilaterally.    EAR:       EXTERNAL: Normal pinnas and bilateral obstructive cerumen impaction was removed by myself with instrumentation using the operating microscope.  Normal pinnas and external auditory canals after cleaning.       MIDDLE EAR: Tympanic membranes intact and mobile with normal landmarks.  Middle ear space appears well aerated.       TUBE STATUS: N/A       MASTOID CAVITY: N/A       HEARING: Gross hearing assessment is within normal limits.      NOSE:       VISUALIZED USING: Anterior rhinoscopy with headlight and nasal speculum.       DORSUM: Midline, nontraumatic appearance.       MUCOSA: Normal-appearing.       SECRETIONS: Normal.       SEPTUM: Midline and nonobstructing.       INFERIOR TURBINATES: Normal.       MIDDLE TURBINATES/MEATUS: N/A       BLEEDING: N/A         ORAL CAVITY/PHARYNX:       TEETH: Adequate dentition.       TONGUE: No mass or lesion.  Normal mobility.       FLOOR OF MOUTH: No mass or lesion.       PALATE: Normal hard palate, soft palate, and uvula.       OROPHARYNX: Normal without mass or lesion.       BUCCAL MUCOSA/GBS: Normal without mass or lesion.       LIPS: Normal.    LARYNX/HYPOPHARYNX/NASOPHARYNX: N/A    NECK: No palpable masses or abnormal adenopathy.  Trachea is midline.    THYROID: No thyromegaly or palpable nodule.    SALIVARY GLANDS: Normal bilateral parotid and submandibular glands by inspection and palpation.    TMJ's: Normal.    NEURO: Cranial nerve exam grossly normal bilaterally.       Assessment/Plan   Zarina was seen today for new patient visit.  Diagnoses and all orders for this visit:  Conductive hearing loss, bilateral (Primary)  Bilateral impacted cerumen  -      Referral to ENT     Both ears cleaned of impacted wax.  She can immediately hear better.  Educated.  Follow up in about 6 months (around 4/1/2025) for Recheck.     Parag Shi MD

## 2025-02-27 ENCOUNTER — ANCILLARY PROCEDURE (OUTPATIENT)
Dept: URGENT CARE | Age: 45
End: 2025-02-27
Payer: COMMERCIAL

## 2025-02-27 ENCOUNTER — OFFICE VISIT (OUTPATIENT)
Dept: URGENT CARE | Age: 45
End: 2025-02-27
Payer: COMMERCIAL

## 2025-02-27 VITALS
OXYGEN SATURATION: 100 % | DIASTOLIC BLOOD PRESSURE: 58 MMHG | BODY MASS INDEX: 27.17 KG/M2 | RESPIRATION RATE: 16 BRPM | WEIGHT: 138.4 LBS | SYSTOLIC BLOOD PRESSURE: 121 MMHG | HEIGHT: 60 IN | TEMPERATURE: 97.7 F | HEART RATE: 54 BPM

## 2025-02-27 DIAGNOSIS — R60.9 EDEMA, UNSPECIFIED TYPE: ICD-10-CM

## 2025-02-27 DIAGNOSIS — M17.11 ARTHRITIS OF RIGHT KNEE: Primary | ICD-10-CM

## 2025-02-27 PROCEDURE — 3008F BODY MASS INDEX DOCD: CPT | Performed by: NURSE PRACTITIONER

## 2025-02-27 PROCEDURE — 1036F TOBACCO NON-USER: CPT | Performed by: NURSE PRACTITIONER

## 2025-02-27 PROCEDURE — 73564 X-RAY EXAM KNEE 4 OR MORE: CPT | Mod: RIGHT SIDE | Performed by: NURSE PRACTITIONER

## 2025-02-27 PROCEDURE — 99213 OFFICE O/P EST LOW 20 MIN: CPT | Performed by: NURSE PRACTITIONER

## 2025-02-27 RX ORDER — IBUPROFEN 800 MG/1
800 TABLET ORAL EVERY 8 HOURS PRN
Qty: 30 TABLET | Refills: 0 | Status: SHIPPED | OUTPATIENT
Start: 2025-02-27

## 2025-02-27 ASSESSMENT — PAIN SCALES - GENERAL: PAINLEVEL_OUTOF10: 8

## 2025-02-27 ASSESSMENT — ENCOUNTER SYMPTOMS
ARTHRALGIAS: 1
HEMATOLOGIC/LYMPHATIC NEGATIVE: 1
CARDIOVASCULAR NEGATIVE: 1
RESPIRATORY NEGATIVE: 1
PSYCHIATRIC NEGATIVE: 1
EYES NEGATIVE: 1
JOINT SWELLING: 1
ALLERGIC/IMMUNOLOGIC NEGATIVE: 1
CONSTITUTIONAL NEGATIVE: 1
ENDOCRINE NEGATIVE: 1
NEUROLOGICAL NEGATIVE: 1

## 2025-02-27 NOTE — PROGRESS NOTES
Subjective   Patient ID: Zarina Carrillo is a 44 y.o. female. They present today with a chief complaint of Injury (Right knee pain she said she set on it wrong 5 days ).    History of Present Illness    History provided by:  Patient   used: No    Injury  Location:  Right knee pain started hurting over weekend  Severity:  Moderate  Onset quality:  Gradual  Duration:  2 days  Timing:  Intermittent  Progression:  Worsening  Chronicity:  New      Past Medical History  Allergies as of 02/27/2025 - Reviewed 02/27/2025   Allergen Reaction Noted    Gluten Agitation 09/23/2024       (Not in a hospital admission)       Past Medical History:   Diagnosis Date    Other specified abnormal findings of blood chemistry 01/14/2017    Abnormal TSH    Personal history of diseases of the skin and subcutaneous tissue 01/14/2017    History of rosacea    Personal history of other diseases of the digestive system     History of celiac disease    Varicella without complication     Varicella without complication    Xerosis cutis     Dry skin       Past Surgical History:   Procedure Laterality Date    CHOLECYSTECTOMY  05/02/2013    Cholecystectomy    KNEE SURGERY  09/11/2015    Knee Surgery        reports that she has never smoked. She has never been exposed to tobacco smoke. She has never used smokeless tobacco. She reports that she does not drink alcohol and does not use drugs.    Review of Systems  Review of Systems   Constitutional: Negative.    HENT: Negative.     Eyes: Negative.    Respiratory: Negative.     Cardiovascular: Negative.    Endocrine: Negative.    Genitourinary: Negative.    Musculoskeletal:  Positive for arthralgias and joint swelling.   Skin: Negative.    Allergic/Immunologic: Negative.    Neurological: Negative.    Hematological: Negative.    Psychiatric/Behavioral: Negative.     All other systems reviewed and are negative.                                 Objective    Vitals:    02/27/25 1405   BP:  121/58   BP Location: Left arm   Patient Position: Sitting   BP Cuff Size: Small adult   Pulse: 54   Resp: 16   Temp: 36.5 °C (97.7 °F)   TempSrc: Oral   SpO2: 100%   Weight: 62.8 kg (138 lb 6.4 oz)   Height: 1.524 m (5')     No LMP recorded.    Physical Exam  Vitals and nursing note reviewed.   Constitutional:       Appearance: Normal appearance. She is normal weight.   HENT:      Head: Normocephalic and atraumatic.   Cardiovascular:      Rate and Rhythm: Normal rate.      Pulses: Normal pulses.      Heart sounds: Normal heart sounds.   Pulmonary:      Effort: Pulmonary effort is normal.      Breath sounds: Normal breath sounds.   Abdominal:      General: Bowel sounds are normal.      Palpations: Abdomen is soft.   Musculoskeletal:      Right knee: Effusion and bony tenderness present. Decreased range of motion. Tenderness present.   Skin:     General: Skin is warm and dry.   Neurological:      General: No focal deficit present.      Mental Status: She is alert.   Psychiatric:         Mood and Affect: Mood normal.         Thought Content: Thought content normal.         Procedures    Point of Care Test & Imaging Results from this visit  No results found for this visit on 02/27/25.   XR knee right 4+ views    Result Date: 2/27/2025  Interpreted By:  Dragan Reyes, STUDY: XR KNEE RIGHT 4+ VIEWS;  2/27/2025 2:16 pm   INDICATION: Signs/Symptoms:edema to right knee.   COMPARISON: None.   ACCESSION NUMBER(S): [id number]   ORDERING CLINICIAN: HANNAH ROOT   FINDINGS: No acute fracture or dislocation. Small osteophytes. Joint spaces otherwise appear preserved. Small joint effusion suspected.       No acute osseous findings of the right knee. Mild degenerative changes.   MACRO: None   Signed by: Dragan Reyes 2/27/2025 2:23 PM Dictation workstation:   [id number]     Diagnostic study results (if any) were reviewed by Hannah Root, HIREN-ABEL.    Assessment/Plan   Allergies, medications, history, and pertinent  labs/EKGs/Imaging reviewed by REJI Fofana.     Medical Decision Making  Medical Decision Making  At time of discharge patient was clinically well-appearing and HDS for outpatient management. The patient and/or family was educated regarding diagnosis, supportive care, OTC and Rx medications. The patient and/or family was given the opportunity to ask questions prior to discharge.  They verbalized understanding of my discussion of the plans for treatment, expected course, indications to return to UC or seek further evaluation in ED, and the need for timely follow up as directed.   They were provided with a work/school excuse if requested.        Orders and Diagnoses  Diagnoses and all orders for this visit:  Arthritis of right knee  -     ibuprofen 800 mg tablet; Take 1 tablet (800 mg) by mouth every 8 hours if needed for moderate pain (4 - 6) (knee pain) for up to 30 doses.  -     Referral to Orthopaedic Surgery; Future  Edema, unspecified type  -     XR knee right 4+ views; Future      Return to Urgent care if symptoms return or progress  Follow up with PCP in 1-2 weeks       Patient disposition: Home    Electronically signed by REJI Fofana  6:59 PM

## 2025-03-12 ENCOUNTER — APPOINTMENT (OUTPATIENT)
Dept: ORTHOPEDIC SURGERY | Facility: CLINIC | Age: 45
End: 2025-03-12
Payer: COMMERCIAL

## 2025-04-01 ENCOUNTER — APPOINTMENT (OUTPATIENT)
Dept: OTOLARYNGOLOGY | Facility: CLINIC | Age: 45
End: 2025-04-01
Payer: COMMERCIAL

## 2025-04-01 VITALS — TEMPERATURE: 97.7 F | WEIGHT: 140 LBS | BODY MASS INDEX: 27.48 KG/M2 | HEIGHT: 60 IN

## 2025-04-01 DIAGNOSIS — H90.0 CONDUCTIVE HEARING LOSS, BILATERAL: ICD-10-CM

## 2025-04-01 DIAGNOSIS — H61.23 BILATERAL IMPACTED CERUMEN: Primary | ICD-10-CM

## 2025-04-01 PROCEDURE — 1036F TOBACCO NON-USER: CPT | Performed by: OTOLARYNGOLOGY

## 2025-04-01 PROCEDURE — 3008F BODY MASS INDEX DOCD: CPT | Performed by: OTOLARYNGOLOGY

## 2025-04-01 PROCEDURE — 69210 REMOVE IMPACTED EAR WAX UNI: CPT | Performed by: OTOLARYNGOLOGY

## 2025-04-01 NOTE — PROGRESS NOTES
Chief Complaint   Patient presents with    Follow-up     LOV10/24 EAR CLEANING     HPI:  Zarina Carrillo is a 44 y.o. female who presents with her mom today for ear cleaning.  History of wax impactions.  Has been doing okay recently.  Feel little bit plugged up.  No infections.    PMH:  Past Medical History:   Diagnosis Date    Other specified abnormal findings of blood chemistry 01/14/2017    Abnormal TSH    Personal history of diseases of the skin and subcutaneous tissue 01/14/2017    History of rosacea    Personal history of other diseases of the digestive system     History of celiac disease    Varicella without complication     Varicella without complication    Xerosis cutis     Dry skin     Past Surgical History:   Procedure Laterality Date    CHOLECYSTECTOMY  05/02/2013    Cholecystectomy    KNEE SURGERY  09/11/2015    Knee Surgery         Medications:     Current Outpatient Medications:     carbamide peroxide (Debrox) 6.5 % otic solution, Use as directed, Disp: , Rfl:     ibuprofen 800 mg tablet, Take 1 tablet (800 mg) by mouth every 8 hours if needed for moderate pain (4 - 6) (knee pain) for up to 30 doses., Disp: 30 tablet, Rfl: 0    ofloxacin (Floxin) 0.3 % otic solution, Administer 5 drops into the left ear 2 times a day., Disp: , Rfl:     dextromethorphan-guaifenesin (Robitussin DM)  mg/5 mL oral liquid, Take 5 mL by mouth 3 times a day as needed for cough. (Patient not taking: Reported on 4/1/2025), Disp: 118 mL, Rfl: 11    ibuprofen 200 mg tablet, Take 1 tablet (200 mg) by mouth every 6 hours. (Patient not taking: Reported on 4/1/2025), Disp: 30 tablet, Rfl: 11    phenylephrine (Nasal Decongestant, PE,) 10 mg tablet, Take 1 tablet (10 mg) by mouth every 4 hours if needed for congestion. (Patient not taking: Reported on 4/1/2025), Disp: 30 tablet, Rfl: 11     Allergies:  Allergies   Allergen Reactions    Gluten Agitation        ROS:  Review of systems normal unless stated otherwise in the HPI  and/or PMH.    Physical Exam:  Temperature 36.5 °C (97.7 °F), height 1.524 m (5'), weight 63.5 kg (140 lb). Body mass index is 27.34 kg/m².     GENERAL APPEARANCE: Well developed and well nourished.  Alert and oriented in no acute distress.  Normal vocal quality.      HEAD/FACE: No erythema or edema or facial tenderness.  Normal facial nerve function bilaterally.    EAR:       EXTERNAL: Normal pinnas and bilateral obstructive cerumen impaction was removed by myself with instrumentation using the operating microscope.  Normal pinnas and external auditory canals after cleaning.       MIDDLE EAR: Tympanic membranes intact and mobile with normal landmarks.  Middle ear space appears well aerated.       TUBE STATUS: N/A       MASTOID CAVITY: N/A       HEARING: Gross hearing assessment is within normal limits.      NOSE:       VISUALIZED USING: Anterior rhinoscopy with headlight and nasal speculum.       DORSUM: Midline, nontraumatic appearance.       MUCOSA: Normal-appearing.       SECRETIONS: Normal.       SEPTUM: Midline and nonobstructing.       INFERIOR TURBINATES: Normal.       MIDDLE TURBINATES/MEATUS: N/A       BLEEDING: N/A         ORAL CAVITY/PHARYNX:       TEETH: Adequate dentition.       TONGUE: No mass or lesion.  Normal mobility.       FLOOR OF MOUTH: No mass or lesion.       PALATE: Normal hard palate, soft palate, and uvula.       OROPHARYNX: Normal without mass or lesion.       BUCCAL MUCOSA/GBS: Normal without mass or lesion.       LIPS: Normal.    LARYNX/HYPOPHARYNX/NASOPHARYNX: N/A    NECK: No palpable masses or abnormal adenopathy.  Trachea is midline.    THYROID: No thyromegaly or palpable nodule.    SALIVARY GLANDS: Normal bilateral parotid and submandibular glands by inspection and palpation.    TMJ's: Normal.    NEURO: Cranial nerve exam grossly normal bilaterally.       Assessment/Plan   Zarina was seen today for follow-up.  Diagnoses and all orders for this visit:  Bilateral impacted cerumen  (Primary)  Conductive hearing loss, bilateral     Both ears cleaned of impacted wax.  This did help.  Follow up in about 6 months (around 10/1/2025).     Parag Shi MD

## 2025-05-01 ENCOUNTER — TELEPHONE (OUTPATIENT)
Dept: PRIMARY CARE | Facility: CLINIC | Age: 45
End: 2025-05-01
Payer: COMMERCIAL

## 2025-05-01 DIAGNOSIS — E06.3 HASHIMOTO'S THYROIDITIS: Primary | ICD-10-CM

## 2025-05-06 LAB — TSH SERPL-ACNC: 3.57 MIU/L

## 2025-05-29 ENCOUNTER — OFFICE VISIT (OUTPATIENT)
Facility: CLINIC | Age: 45
End: 2025-05-29
Payer: COMMERCIAL

## 2025-05-29 VITALS
WEIGHT: 141 LBS | DIASTOLIC BLOOD PRESSURE: 64 MMHG | HEART RATE: 76 BPM | RESPIRATION RATE: 16 BRPM | HEIGHT: 60 IN | SYSTOLIC BLOOD PRESSURE: 120 MMHG | BODY MASS INDEX: 27.68 KG/M2

## 2025-05-29 DIAGNOSIS — E06.3 HASHIMOTO'S THYROIDITIS: Primary | ICD-10-CM

## 2025-05-29 PROBLEM — Z30.9 ENCOUNTER FOR BIRTH CONTROL: Status: RESOLVED | Noted: 2023-03-21 | Resolved: 2025-05-29

## 2025-05-29 PROBLEM — E28.2 PCOS (POLYCYSTIC OVARIAN SYNDROME): Status: RESOLVED | Noted: 2023-03-09 | Resolved: 2025-05-29

## 2025-05-29 PROBLEM — H60.509 ACUTE OTITIS EXTERNA: Status: RESOLVED | Noted: 2023-03-10 | Resolved: 2025-05-29

## 2025-05-29 PROCEDURE — 1036F TOBACCO NON-USER: CPT | Performed by: INTERNAL MEDICINE

## 2025-05-29 PROCEDURE — 99213 OFFICE O/P EST LOW 20 MIN: CPT | Performed by: INTERNAL MEDICINE

## 2025-05-29 PROCEDURE — 3008F BODY MASS INDEX DOCD: CPT | Performed by: INTERNAL MEDICINE

## 2025-05-29 ASSESSMENT — ENCOUNTER SYMPTOMS
DIARRHEA: 0
COUGH: 0
FATIGUE: 0
FEVER: 0
CHILLS: 0
VOMITING: 0
SHORTNESS OF BREATH: 0
HEADACHES: 0
PALPITATIONS: 0
NAUSEA: 0

## 2025-05-29 NOTE — PROGRESS NOTES
Endocrinology: Follow up visit  Subjective   Patient ID: Zarina Carrillo is a 44 y.o. female who presents for Hypothyroidism.    PCP: Brittany Behm, DO    HPI  Here for follow up hypothyroidism..... levels resolved with no med so watching only  Since last visit a year ago, Zarina is doing well in her group home.  Still home on weekends especially lately as father ill and helps mom considerably.  She is feeling well.  No complaints.      Review of Systems   Constitutional:  Negative for chills, fatigue and fever.   Respiratory:  Negative for cough and shortness of breath.    Cardiovascular:  Negative for chest pain and palpitations.   Gastrointestinal:  Negative for diarrhea, nausea and vomiting.   Neurological:  Negative for headaches.       Problem List[1]     Home Meds:  Current Outpatient Medications   Medication Instructions    carbamide peroxide (Debrox) 6.5 % otic solution Use as directed    dextromethorphan-guaifenesin (Robitussin DM)  mg/5 mL oral liquid 5 mL, oral, 3 times daily PRN    ibuprofen 200 mg, oral, Every 6 hours    ibuprofen 800 mg, oral, Every 8 hours PRN    ofloxacin (Floxin) 0.3 % otic solution 5 drops, 2 times daily    phenylephrine (NASAL DECONGESTANT (PE)) 10 mg, oral, Every 4 hours PRN        RX Allergies[2]     Objective   Vitals:    05/29/25 1622   BP: 120/64   Pulse: 76   Resp: 16      Vitals:    05/29/25 1622   Weight: 64 kg (141 lb)      Body mass index is 27.54 kg/m².   Physical Exam  Constitutional:       Appearance: Normal appearance. She is overweight.   HENT:      Head: Normocephalic and atraumatic.   Neck:      Thyroid: No thyroid mass, thyromegaly or thyroid tenderness.   Cardiovascular:      Rate and Rhythm: Normal rate and regular rhythm.      Heart sounds: No murmur heard.     No gallop.   Pulmonary:      Effort: Pulmonary effort is normal.      Breath sounds: Normal breath sounds.   Abdominal:      Palpations: Abdomen is soft.      Comments: benign   Neurological:       General: No focal deficit present.      Mental Status: She is alert and oriented to person, place, and time.      Deep Tendon Reflexes: Reflexes are normal and symmetric.   Psychiatric:         Behavior: Behavior is cooperative.         Labs:  Lab Results   Component Value Date    HGBA1C 5.2 06/07/2024    TSH 3.57 05/05/2025    FREET4 0.83 03/09/2023      Lab Results   Component Value Date    THYROIDPAB 419 (A) 02/04/2021        Assessment/Plan   Assessment & Plan  Hashimoto's thyroiditis    Blood work reviewed.    Levels are normal  No need to start t4 at this time  Follow up in one year      Electronically signed by:  Susie Zhao MD 05/29/25 4:29 PM                   [1]   Patient Active Problem List  Diagnosis    Hirsutism    Hypothyroidism    Thyroid nodule    Acne    Cerumen impaction    Impacted cerumen of left ear    Irregular menses    Acute suppurative otitis media of right ear without spontaneous rupture of tympanic membrane    Visit for suture removal   [2]   Allergies  Allergen Reactions    Gluten Agitation

## 2025-07-18 ENCOUNTER — APPOINTMENT (OUTPATIENT)
Facility: CLINIC | Age: 45
End: 2025-07-18
Payer: COMMERCIAL

## 2025-07-22 ENCOUNTER — APPOINTMENT (OUTPATIENT)
Facility: CLINIC | Age: 45
End: 2025-07-22
Payer: COMMERCIAL

## 2025-07-22 VITALS
SYSTOLIC BLOOD PRESSURE: 120 MMHG | DIASTOLIC BLOOD PRESSURE: 80 MMHG | HEIGHT: 61 IN | BODY MASS INDEX: 27 KG/M2 | WEIGHT: 143 LBS

## 2025-07-22 DIAGNOSIS — Z12.31 BREAST CANCER SCREENING BY MAMMOGRAM: ICD-10-CM

## 2025-07-22 DIAGNOSIS — Z00.00 WELL WOMAN EXAM WITHOUT GYNECOLOGICAL EXAM: ICD-10-CM

## 2025-07-22 PROCEDURE — 99396 PREV VISIT EST AGE 40-64: CPT | Performed by: ADVANCED PRACTICE MIDWIFE

## 2025-07-22 PROCEDURE — 3008F BODY MASS INDEX DOCD: CPT | Performed by: ADVANCED PRACTICE MIDWIFE

## 2025-07-22 PROCEDURE — 1036F TOBACCO NON-USER: CPT | Performed by: ADVANCED PRACTICE MIDWIFE

## 2025-07-22 RX ORDER — CHOLECALCIFEROL (VITAMIN D3) 25 MCG
25 TABLET ORAL DAILY
COMMUNITY

## 2025-07-22 RX ORDER — MULTIVIT-MIN/IRON FUM/FOLIC AC 7.5 MG-4
1 TABLET ORAL DAILY
COMMUNITY

## 2025-07-22 ASSESSMENT — ENCOUNTER SYMPTOMS
VOMITING: 0
SHORTNESS OF BREATH: 0
CONSTIPATION: 0
FEVER: 0
FATIGUE: 0
LOSS OF SENSATION IN FEET: 0
OCCASIONAL FEELINGS OF UNSTEADINESS: 0
LIGHT-HEADEDNESS: 0
NAUSEA: 0
DIARRHEA: 0
ABDOMINAL PAIN: 0
COUGH: 0
DEPRESSION: 0
PALPITATIONS: 0
DIZZINESS: 0

## 2025-07-22 ASSESSMENT — COLUMBIA-SUICIDE SEVERITY RATING SCALE - C-SSRS
1. IN THE PAST MONTH, HAVE YOU WISHED YOU WERE DEAD OR WISHED YOU COULD GO TO SLEEP AND NOT WAKE UP?: NO
6. HAVE YOU EVER DONE ANYTHING, STARTED TO DO ANYTHING, OR PREPARED TO DO ANYTHING TO END YOUR LIFE?: NO
2. HAVE YOU ACTUALLY HAD ANY THOUGHTS OF KILLING YOURSELF?: NO

## 2025-07-22 ASSESSMENT — PATIENT HEALTH QUESTIONNAIRE - PHQ9
1. LITTLE INTEREST OR PLEASURE IN DOING THINGS: NOT AT ALL
SUM OF ALL RESPONSES TO PHQ9 QUESTIONS 1 AND 2: 0
2. FEELING DOWN, DEPRESSED OR HOPELESS: NOT AT ALL

## 2025-07-22 ASSESSMENT — LIFESTYLE VARIABLES
HOW OFTEN DO YOU HAVE SIX OR MORE DRINKS ON ONE OCCASION: NEVER
AUDIT-C TOTAL SCORE: 0
HOW OFTEN DO YOU HAVE A DRINK CONTAINING ALCOHOL: NEVER
HOW MANY STANDARD DRINKS CONTAINING ALCOHOL DO YOU HAVE ON A TYPICAL DAY: PATIENT DOES NOT DRINK
SKIP TO QUESTIONS 9-10: 1

## 2025-07-22 ASSESSMENT — PAIN SCALES - GENERAL: PAINLEVEL_OUTOF10: 0-NO PAIN

## 2025-07-22 NOTE — PROGRESS NOTES
Subjective   Patient ID: Zarina Carrillo is a 44 y.o. female who presents for Well Women Visit (Established patient./Patient has always declined vaginal exams. /Last Mammogram 2024 : normal ).  HPI  Not sexually active. Zarina works at [employer] 4 days a week and exercises daily. Currently in spin class with her mom. Zarina has a cat, Paris. Patient has never had a pap, declines pelvic exams.     Moved into group home. LMP 12/2024. Denies hot flashes or night sweats.         Review of Systems   Constitutional:  Negative for fatigue and fever.   Respiratory:  Negative for cough and shortness of breath.    Cardiovascular:  Negative for chest pain and palpitations.   Gastrointestinal:  Negative for abdominal pain, constipation, diarrhea, nausea and vomiting.   Endocrine: Negative for cold intolerance and heat intolerance.   Genitourinary:  Negative for dyspareunia, pelvic pain, vaginal discharge and vaginal pain.   Neurological:  Negative for dizziness and light-headedness.       Objective   Physical Exam  Vitals reviewed.   Constitutional:       Appearance: Normal appearance.   Neck:      Thyroid: No thyroid mass, thyromegaly or thyroid tenderness.     Cardiovascular:      Rate and Rhythm: Normal rate and regular rhythm.      Heart sounds: Normal heart sounds.   Pulmonary:      Effort: Pulmonary effort is normal.      Breath sounds: Normal breath sounds.   Chest:   Breasts:     Right: Normal. No mass.      Left: Normal. No mass.   Abdominal:      General: Bowel sounds are normal.      Palpations: Abdomen is soft.      Tenderness: There is no abdominal tenderness.   Genitourinary:     General: Normal vulva.      Vagina: Normal.      Cervix: Normal.      Uterus: Normal.       Adnexa: Right adnexa normal.     Musculoskeletal:         General: Normal range of motion.      Cervical back: No tenderness.     Skin:     General: Skin is warm.     Neurological:      General: No focal deficit present.      Mental Status:  She is alert and oriented to person, place, and time.     Psychiatric:         Attention and Perception: Attention normal.         Behavior: Behavior normal.         Assessment/Plan   Diagnoses and all orders for this visit:  Well woman exam without gynecological exam  Breast cancer screening by mammogram  -     BI mammo bilateral screening; Future  - Reviewed healthy eating habits and exercise. Recommended 30 min a day at least 3 days a week including weight bearing exercise.   - Recommended self breast exam  - RTO 1 year or as needed           SHARMAINE Ferrera 07/22/25 2:40 PM

## 2025-08-07 ENCOUNTER — HOSPITAL ENCOUNTER (EMERGENCY)
Facility: HOSPITAL | Age: 45
Discharge: HOME | End: 2025-08-07
Payer: COMMERCIAL

## 2025-08-07 VITALS
HEART RATE: 65 BPM | SYSTOLIC BLOOD PRESSURE: 126 MMHG | OXYGEN SATURATION: 99 % | BODY MASS INDEX: 26.43 KG/M2 | WEIGHT: 140 LBS | RESPIRATION RATE: 18 BRPM | TEMPERATURE: 98.4 F | HEIGHT: 61 IN | DIASTOLIC BLOOD PRESSURE: 65 MMHG

## 2025-08-07 DIAGNOSIS — H66.003 ACUTE SUPPURATIVE OTITIS MEDIA OF BOTH EARS WITHOUT SPONTANEOUS RUPTURE OF TYMPANIC MEMBRANES, RECURRENCE NOT SPECIFIED: Primary | ICD-10-CM

## 2025-08-07 DIAGNOSIS — H60.501 ACUTE OTITIS EXTERNA OF RIGHT EAR, UNSPECIFIED TYPE: ICD-10-CM

## 2025-08-07 PROCEDURE — 99283 EMERGENCY DEPT VISIT LOW MDM: CPT

## 2025-08-07 PROCEDURE — 2500000002 HC RX 250 W HCPCS SELF ADMINISTERED DRUGS (ALT 637 FOR MEDICARE OP, ALT 636 FOR OP/ED): Performed by: HEALTH CARE PROVIDER

## 2025-08-07 PROCEDURE — 2500000001 HC RX 250 WO HCPCS SELF ADMINISTERED DRUGS (ALT 637 FOR MEDICARE OP): Performed by: HEALTH CARE PROVIDER

## 2025-08-07 RX ORDER — AMOXICILLIN 250 MG/1
500 CAPSULE ORAL ONCE
Status: COMPLETED | OUTPATIENT
Start: 2025-08-07 | End: 2025-08-07

## 2025-08-07 RX ORDER — CIPROFLOXACIN AND DEXAMETHASONE 3; 1 MG/ML; MG/ML
4 SUSPENSION/ DROPS AURICULAR (OTIC) ONCE
Status: COMPLETED | OUTPATIENT
Start: 2025-08-07 | End: 2025-08-07

## 2025-08-07 RX ORDER — CIPROFLOXACIN AND DEXAMETHASONE 3; 1 MG/ML; MG/ML
4 SUSPENSION/ DROPS AURICULAR (OTIC) 2 TIMES DAILY
Qty: 7.5 ML | Refills: 0 | Status: SHIPPED | OUTPATIENT
Start: 2025-08-07 | End: 2025-08-13

## 2025-08-07 RX ORDER — AMOXICILLIN AND CLAVULANATE POTASSIUM 875; 125 MG/1; MG/1
1 TABLET, FILM COATED ORAL EVERY 12 HOURS
Qty: 20 TABLET | Refills: 0 | Status: SHIPPED | OUTPATIENT
Start: 2025-08-07 | End: 2025-08-18

## 2025-08-07 RX ADMIN — CIPROFLOXACIN AND DEXAMETHASONE 4 DROP: 3; 1 SUSPENSION/ DROPS AURICULAR (OTIC) at 22:01

## 2025-08-07 RX ADMIN — AMOXICILLIN 500 MG: 250 CAPSULE ORAL at 22:01

## 2025-08-07 ASSESSMENT — LIFESTYLE VARIABLES
EVER HAD A DRINK FIRST THING IN THE MORNING TO STEADY YOUR NERVES TO GET RID OF A HANGOVER: NO
EVER FELT BAD OR GUILTY ABOUT YOUR DRINKING: NO
HAVE PEOPLE ANNOYED YOU BY CRITICIZING YOUR DRINKING: NO
TOTAL SCORE: 0
HAVE YOU EVER FELT YOU SHOULD CUT DOWN ON YOUR DRINKING: NO

## 2025-08-07 ASSESSMENT — PAIN - FUNCTIONAL ASSESSMENT: PAIN_FUNCTIONAL_ASSESSMENT: UNABLE TO SELF-REPORT

## 2025-08-08 NOTE — ED PROVIDER NOTES
HPI   Chief Complaint   Patient presents with   • Earache       CC: Bilateral earache greater in the right  HPI:   44-year-old female presents ED with acute onset of bilateral earache ear pain worse on the right that started 24 hours ago.  Parent reports that patient has had ear infections in the past and has been seen by ear nose and throat specialist she intermittently uses [facility] for cerumen impaction.  No reported fevers, chills patient denies any headache, dizziness or cervical neck tenderness denies any nausea vomiting, she does report some decreased hearing in the right ear denies any external ear canal discharge.    Additional Limitations to History:   External Records Reviewed: I reviewed recent and relevant outside records including   History Obtained From:     Past Medical History: Per HPI  Medications: Reviewed in EMR and with patient  Allergies:  Reviewed in EMR  Past Surgical History:   Social History:     ------------------------------------------------------------------------------------------------------  Physical Exam:  --Vital signs reviewed in nursing triage note, EMR flow sheets, and at patient's bedside  GEN:  A&Ox3, no acute distress, appears comfortable.  Conversational and appropriate.  No confusion or gross mental status changes.  EYES: EOMI, non-injected sclera.  ENT: Moist mucous membranes, no apparent injuries or lesions.   CARDIO: Normal rate and regular rhythm. No murmurs, rubs, or gallops.  2+ equal pulses of the distal extremities.   PULM: Clear to auscultation bilaterally. No rales, rhonchi, or wheezes. Good symmetric chest expansion.  GI: Soft, non-tender, non-distended. No rebound tenderness or guarding.  SKIN: Warm and dry, no rashes or lesions.  MSK: ROM intact the extremities without contractures.   EXT: No peripheral edema, contusions, or wounds.   NEURO: Cranial nerves II-XII grossly intact. Sensation to light touch intact and equal bilaterally in upper and lower extremities.   Symmetric 5/5 strength in upper and lower extremities.  PSYCH: Appropriate mood and behavior, converses and responds appropriately during exam.  -------------------------------------------------------------------------------------------------------      Differential Diagnoses Considered:   Chronic Medical Conditions Significantly Affecting Care:   Diagnostic testing considered: [PERC, D-Dimer, PECARN, etc.]      - I independently interpreted: [CXR, CT, POCUS, etc. including your interpretation]  - Labs notable for     Escalation of Care: Appropriate for   Social Determinants of Health Significantly Affecting Care: [Homelessness, lacking transportation, uninsured, unable to afford medications]  Prescription Drug Consideration: [Antibiotics, antivirals, pain medications, etc.]  Discussion of Management with Other Providers:  I discussed the patient/results with: [admitting team, consultant, radiologist, social work, EPAT, case management, PT/OT, RT, PCP, etc.]      Jamie Garvin PA-C              Patient History   Medical History[1]  Surgical History[2]  Family History[3]  Social History[4]    Physical Exam   ED Triage Vitals [08/07/25 2128]   Temperature Heart Rate Respirations BP   36.4 °C (97.5 °F) 54 18 126/72      Pulse Ox Temp Source Heart Rate Source Patient Position   100 % Temporal Monitor Sitting      BP Location FiO2 (%)     -- --       Physical Exam  HENT:      Right Ear: Swelling and tenderness present. A middle ear effusion is present. No foreign body. No mastoid tenderness. Tympanic membrane is erythematous.      Left Ear: No swelling. A middle ear effusion is present. No foreign body. No mastoid tenderness. Tympanic membrane is erythematous.      Ears:      Comments: Right EAC is moderately erythematous edematous  Left EAC is slightly erythematous  Bilateral tympanic membranes erythematous, slightly bulged, loss of landmarks          ED Course & MDM   Diagnoses as of 08/07/25 2147   Acute suppurative  otitis media of both ears without spontaneous rupture of tympanic membranes, recurrence not specified   Acute otitis externa of right ear, unspecified type                 No data recorded     Otto Coma Scale Score: 15 (08/07/25 2129 : Jenifer Adorno RN)                           Medical Decision Making  44-year-old female with acute onset of bilateral otitis media with effusion right otitis externa including treat symptomatically with oral and topical antibiotics, there is low suspicion for acute mastoiditis given no mastoid tenderness erythema or edema there is low suspicion for malignant otitis, patient is neurologically intact he medically stable, afebrile, in no acute distress.  Advised outpatient follow-up with ENT provider.  Understands instructions to return to ED if symptoms continue or worsen or she develops any fevers headache dizziness.        Procedure  Procedures         [1]  Past Medical History:  Diagnosis Date   • Other specified abnormal findings of blood chemistry 01/14/2017    Abnormal TSH   • Personal history of diseases of the skin and subcutaneous tissue 01/14/2017    History of rosacea   • Personal history of other diseases of the digestive system     History of celiac disease   • Varicella without complication     Varicella without complication   • Xerosis cutis     Dry skin   [2]  Past Surgical History:  Procedure Laterality Date   • CHOLECYSTECTOMY  05/02/2013    Cholecystectomy   • KNEE SURGERY  09/11/2015    Knee Surgery   [3]  Family History  Problem Relation Name Age of Onset   • Breast cancer Mother  68   • Melanoma Mother          malignant   • Breast cancer Paternal Grandmother  80   [4]  Social History  Tobacco Use   • Smoking status: Never     Passive exposure: Never   • Smokeless tobacco: Never   Vaping Use   • Vaping status: Never Used   Substance Use Topics   • Alcohol use: Never   • Drug use: Never      Jamie Garvin PA-C  08/07/25 1432

## 2025-08-08 NOTE — ED TRIAGE NOTES
Pt presented to the ED with c/o earache. Pt states it started yesterday in both ears. No fevers or chills and no pain meds today. Pt is unable to describe pain at this time. Mom is in room with mom at this time.

## 2025-09-25 ENCOUNTER — APPOINTMENT (OUTPATIENT)
Dept: PRIMARY CARE | Facility: CLINIC | Age: 45
End: 2025-09-25
Payer: COMMERCIAL

## 2025-09-29 ENCOUNTER — APPOINTMENT (OUTPATIENT)
Dept: PRIMARY CARE | Facility: CLINIC | Age: 45
End: 2025-09-29
Payer: COMMERCIAL

## 2025-10-02 ENCOUNTER — APPOINTMENT (OUTPATIENT)
Dept: OTOLARYNGOLOGY | Facility: CLINIC | Age: 45
End: 2025-10-02
Payer: COMMERCIAL

## 2025-10-06 ENCOUNTER — APPOINTMENT (OUTPATIENT)
Dept: PRIMARY CARE | Facility: CLINIC | Age: 45
End: 2025-10-06
Payer: COMMERCIAL

## 2025-10-13 ENCOUNTER — APPOINTMENT (OUTPATIENT)
Dept: PRIMARY CARE | Facility: CLINIC | Age: 45
End: 2025-10-13
Payer: COMMERCIAL

## 2026-07-28 ENCOUNTER — APPOINTMENT (OUTPATIENT)
Facility: CLINIC | Age: 46
End: 2026-07-28
Payer: COMMERCIAL